# Patient Record
Sex: MALE | Race: WHITE | NOT HISPANIC OR LATINO | URBAN - NONMETROPOLITAN AREA
[De-identification: names, ages, dates, MRNs, and addresses within clinical notes are randomized per-mention and may not be internally consistent; named-entity substitution may affect disease eponyms.]

---

## 2017-05-03 ENCOUNTER — FOLLOW UP ESTABLISHED (OUTPATIENT)
Dept: URBAN - NONMETROPOLITAN AREA CLINIC 13 | Facility: CLINIC | Age: 82
End: 2017-05-03
Payer: COMMERCIAL

## 2017-05-03 DIAGNOSIS — H35.363 DRUSEN (DEGENERATIVE) OF MACULA, BILATERAL: Primary | ICD-10-CM

## 2017-05-03 PROCEDURE — 92014 COMPRE OPH EXAM EST PT 1/>: CPT | Performed by: OPTOMETRIST

## 2017-05-03 PROCEDURE — 92134 CPTRZ OPH DX IMG PST SGM RTA: CPT | Performed by: OPTOMETRIST

## 2017-05-03 PROCEDURE — 92015 DETERMINE REFRACTIVE STATE: CPT | Performed by: OPTOMETRIST

## 2017-05-03 ASSESSMENT — INTRAOCULAR PRESSURE
OD: 11
OS: 12

## 2017-05-03 ASSESSMENT — VISUAL ACUITY
OS: 20/25
OD: 20/25

## 2018-06-13 ENCOUNTER — FOLLOW UP ESTABLISHED (OUTPATIENT)
Dept: URBAN - NONMETROPOLITAN AREA CLINIC 13 | Facility: CLINIC | Age: 83
End: 2018-06-13
Payer: COMMERCIAL

## 2018-06-13 DIAGNOSIS — H53.2 DIPLOPIA: ICD-10-CM

## 2018-06-13 PROCEDURE — 92134 CPTRZ OPH DX IMG PST SGM RTA: CPT | Performed by: OPTOMETRIST

## 2018-06-13 PROCEDURE — 92014 COMPRE OPH EXAM EST PT 1/>: CPT | Performed by: OPTOMETRIST

## 2018-06-13 PROCEDURE — 92015 DETERMINE REFRACTIVE STATE: CPT | Performed by: OPTOMETRIST

## 2018-06-13 ASSESSMENT — INTRAOCULAR PRESSURE
OD: 12
OS: 12

## 2018-06-13 ASSESSMENT — VISUAL ACUITY
OS: 20/25
OD: 20/25

## 2019-05-29 ENCOUNTER — FOLLOW UP ESTABLISHED (OUTPATIENT)
Dept: URBAN - NONMETROPOLITAN AREA CLINIC 13 | Facility: CLINIC | Age: 84
End: 2019-05-29
Payer: COMMERCIAL

## 2019-05-29 DIAGNOSIS — H04.123 DRY EYE SYNDROME OF BILATERAL LACRIMAL GLANDS: ICD-10-CM

## 2019-05-29 PROCEDURE — 92134 CPTRZ OPH DX IMG PST SGM RTA: CPT | Performed by: OPTOMETRIST

## 2019-05-29 PROCEDURE — 92015 DETERMINE REFRACTIVE STATE: CPT | Performed by: OPTOMETRIST

## 2019-05-29 PROCEDURE — 92014 COMPRE OPH EXAM EST PT 1/>: CPT | Performed by: OPTOMETRIST

## 2019-05-29 ASSESSMENT — VISUAL ACUITY
OD: 20/20
OS: 20/20

## 2019-05-29 ASSESSMENT — INTRAOCULAR PRESSURE
OD: 11
OS: 9

## 2020-09-02 ENCOUNTER — FOLLOW UP ESTABLISHED (OUTPATIENT)
Dept: URBAN - NONMETROPOLITAN AREA CLINIC 13 | Facility: CLINIC | Age: 85
End: 2020-09-02
Payer: COMMERCIAL

## 2020-09-02 DIAGNOSIS — H35.372 PUCKERING OF MACULA, LEFT EYE: ICD-10-CM

## 2020-09-02 DIAGNOSIS — H52.223 REGULAR ASTIGMATISM, BILATERAL: ICD-10-CM

## 2020-09-02 PROCEDURE — 92134 CPTRZ OPH DX IMG PST SGM RTA: CPT | Performed by: OPTOMETRIST

## 2020-09-02 PROCEDURE — 92014 COMPRE OPH EXAM EST PT 1/>: CPT | Performed by: OPTOMETRIST

## 2020-09-02 PROCEDURE — 92015 DETERMINE REFRACTIVE STATE: CPT | Performed by: OPTOMETRIST

## 2020-09-02 ASSESSMENT — VISUAL ACUITY
OS: 20/25
OD: 20/30

## 2020-09-02 ASSESSMENT — INTRAOCULAR PRESSURE
OS: 11
OD: 13

## 2020-11-30 ENCOUNTER — FOLLOW UP ESTABLISHED (OUTPATIENT)
Dept: URBAN - NONMETROPOLITAN AREA CLINIC 13 | Facility: CLINIC | Age: 85
End: 2020-11-30
Payer: COMMERCIAL

## 2020-11-30 DIAGNOSIS — H17.11 CENTRAL CORNEAL OPACITY, RIGHT EYE: ICD-10-CM

## 2020-11-30 DIAGNOSIS — H25.13 AGE-RELATED NUCLEAR CATARACT, BILATERAL: ICD-10-CM

## 2020-11-30 PROCEDURE — 99213 OFFICE O/P EST LOW 20 MIN: CPT | Performed by: OPTOMETRIST

## 2020-11-30 ASSESSMENT — VISUAL ACUITY
OS: 20/30
OD: 20/30

## 2020-11-30 ASSESSMENT — INTRAOCULAR PRESSURE
OD: 10
OS: 8

## 2021-03-01 ENCOUNTER — RX CHECK (OUTPATIENT)
Dept: URBAN - NONMETROPOLITAN AREA CLINIC 13 | Facility: CLINIC | Age: 86
End: 2021-03-01

## 2021-03-01 PROCEDURE — 92015 DETERMINE REFRACTIVE STATE: CPT | Performed by: OPTOMETRIST

## 2021-03-01 ASSESSMENT — VISUAL ACUITY
OD: 20/25
OS: 20/20

## 2021-03-01 ASSESSMENT — INTRAOCULAR PRESSURE
OD: 11
OS: 9

## 2022-05-11 ENCOUNTER — OFFICE VISIT (OUTPATIENT)
Dept: URBAN - NONMETROPOLITAN AREA CLINIC 13 | Facility: CLINIC | Age: 87
End: 2022-05-11
Payer: COMMERCIAL

## 2022-05-11 DIAGNOSIS — H53.2 DIPLOPIA: ICD-10-CM

## 2022-05-11 DIAGNOSIS — H52.223 REGULAR ASTIGMATISM, BILATERAL: ICD-10-CM

## 2022-05-11 DIAGNOSIS — H04.123 TEAR FILM INSUFFICIENCY OF BILATERAL LACRIMAL GLANDS: ICD-10-CM

## 2022-05-11 DIAGNOSIS — H25.13 AGE-RELATED NUCLEAR CATARACT, BILATERAL: Primary | ICD-10-CM

## 2022-05-11 DIAGNOSIS — H17.11 CENTRAL CORNEAL OPACITY OF RIGHT EYE: ICD-10-CM

## 2022-05-11 DIAGNOSIS — H35.363 DRUSEN (DEGENERATIVE) OF MACULA, BILATERAL: ICD-10-CM

## 2022-05-11 PROCEDURE — 99214 OFFICE O/P EST MOD 30 MIN: CPT | Performed by: OPTOMETRIST

## 2022-05-11 ASSESSMENT — INTRAOCULAR PRESSURE
OD: 11
OS: 10

## 2022-05-11 ASSESSMENT — VISUAL ACUITY
OS: 20/20
OD: 20/20

## 2022-05-11 NOTE — IMPRESSION/PLAN
Impression: Regular astigmatism, bilateral: H52.223. Plan: postponed rx 2' to large amounts or prism. will refer for strab surgery to try to get eyes in better alignment, then rtc for glasses check.

## 2022-05-11 NOTE — IMPRESSION/PLAN
Impression: Drusen (degenerative) of macula, bilateral: H35.363. Plan: stable mild changes on exam and previous oct, needs monitor.

## 2022-05-11 NOTE — IMPRESSION/PLAN
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 Plan: longstanding and stable, continue w/ tears daily OU.

## 2022-05-11 NOTE — IMPRESSION/PLAN
Impression: Diplopia: H53.2. Plan: pt has esotropia w/ worsening double and increasing prism but prism so strong, having hard time function w/ glasses refer to strab surgeon for consult w/ possible improved ocular alignment.

## 2022-05-11 NOTE — IMPRESSION/PLAN
Impression: Age-related nuclear cataract, bilateral: H25.13. Plan: Cataracts account for the patient's complaints. Discussed options, surgery or spectacle change. Explained surgery risks, benefits, procedures and recovery. Patient defers surgery and elects to try strab surgery first.  If there is no improvement, ok to schedule surgery.

## 2023-05-24 ENCOUNTER — OFFICE VISIT (OUTPATIENT)
Dept: URBAN - NONMETROPOLITAN AREA CLINIC 13 | Facility: CLINIC | Age: 88
End: 2023-05-24
Payer: COMMERCIAL

## 2023-05-24 DIAGNOSIS — H04.123 TEAR FILM INSUFFICIENCY OF BILATERAL LACRIMAL GLANDS: ICD-10-CM

## 2023-05-24 DIAGNOSIS — H53.2 DIPLOPIA: ICD-10-CM

## 2023-05-24 DIAGNOSIS — H25.813 COMBINED FORMS OF AGE-RELATED CATARACT, BILATERAL: Primary | ICD-10-CM

## 2023-05-24 DIAGNOSIS — H35.372 PUCKERING OF MACULA, LEFT EYE: ICD-10-CM

## 2023-05-24 DIAGNOSIS — H52.223 REGULAR ASTIGMATISM, BILATERAL: ICD-10-CM

## 2023-05-24 PROCEDURE — 92134 CPTRZ OPH DX IMG PST SGM RTA: CPT | Performed by: OPTOMETRIST

## 2023-05-24 PROCEDURE — 99214 OFFICE O/P EST MOD 30 MIN: CPT | Performed by: OPTOMETRIST

## 2023-05-24 ASSESSMENT — VISUAL ACUITY
OS: 20/25
OD: 20/20

## 2023-05-24 ASSESSMENT — INTRAOCULAR PRESSURE
OS: 9
OD: 9

## 2023-05-24 NOTE — IMPRESSION/PLAN
Impression: Puckering of macula, left eye: H35.372. Plan: mild on exam and oct but still may limit post operative outcome.

## 2023-05-24 NOTE — IMPRESSION/PLAN
Impression: Combined forms of age-related cataract, bilateral: H25.813. Plan: Cataracts account for the patient's complaints. Discussed all risks, benefits, procedures and recovery. Patient understands changing glasses will not improve vision. Patient desires to have surgery, recommend phacoemulsification with intraocular lens.  plano outcome rec'd OS then OD, start tears daily, pt has diplopia so will be affected post surgery, refer to omd.

## 2023-05-24 NOTE — IMPRESSION/PLAN
Impression: Diplopia: H53.2.  Plan: pt has esotropia w/ worsening double and increasing prism but prism, still just monitor for now, will reassess after cat sx OU

## 2023-07-13 ENCOUNTER — OFFICE VISIT (OUTPATIENT)
Dept: URBAN - NONMETROPOLITAN AREA CLINIC 13 | Facility: CLINIC | Age: 88
End: 2023-07-13
Payer: COMMERCIAL

## 2023-07-13 DIAGNOSIS — H04.123 TEAR FILM INSUFFICIENCY OF BILATERAL LACRIMAL GLANDS: ICD-10-CM

## 2023-07-13 DIAGNOSIS — H35.372 PUCKERING OF MACULA, LEFT EYE: ICD-10-CM

## 2023-07-13 DIAGNOSIS — H25.811 COMBINED FORMS OF AGE-RELATED CATARACT, RIGHT EYE: ICD-10-CM

## 2023-07-13 DIAGNOSIS — H25.813 COMBINED FORMS OF AGE-RELATED CATARACT, BILATERAL: Primary | ICD-10-CM

## 2023-07-13 DIAGNOSIS — H53.2 DIPLOPIA: ICD-10-CM

## 2023-07-13 PROCEDURE — 99204 OFFICE O/P NEW MOD 45 MIN: CPT | Performed by: OPHTHALMOLOGY

## 2023-07-13 ASSESSMENT — INTRAOCULAR PRESSURE
OS: 9
OD: 7

## 2023-07-13 NOTE — IMPRESSION/PLAN
Impression: Combined forms of age-related cataract, bilateral: H25.813. Plan: Cataracts account for patient visual complaints and decrease in ADL's.  expect surgery to improve functional vision. Glasses do not adequately correct vision. Discussed diagnosis in detail with patient. Discussed treatment options with patient. Patient elects to have surgery. Surgical risks and benefits were discussed, explained and understood by patient. OS then OD std iol aim plano OD  plano OS ok dextenza.   order ascan/optical biometry for iol calculation will need glasses with prism for diplopia no PHONG

## 2023-07-24 ENCOUNTER — TESTING ONLY (OUTPATIENT)
Dept: URBAN - NONMETROPOLITAN AREA CLINIC 13 | Facility: CLINIC | Age: 88
End: 2023-07-24
Payer: COMMERCIAL

## 2023-07-24 DIAGNOSIS — Z01.818 ENCOUNTER FOR OTHER PREPROCEDURAL EXAMINATION: Primary | ICD-10-CM

## 2023-07-24 DIAGNOSIS — H25.13 AGE-RELATED NUCLEAR CATARACT, BILATERAL: ICD-10-CM

## 2023-07-24 PROCEDURE — 99203 OFFICE O/P NEW LOW 30 MIN: CPT | Performed by: PHYSICIAN ASSISTANT

## 2023-08-14 ENCOUNTER — SURGERY (OUTPATIENT)
Dept: URBAN - NONMETROPOLITAN AREA SURGERY 4 | Facility: SURGERY | Age: 88
End: 2023-08-14
Payer: COMMERCIAL

## 2023-08-14 DIAGNOSIS — H25.813 COMBINED FORMS OF AGE-RELATED CATARACT, BILATERAL: Primary | ICD-10-CM

## 2023-08-14 PROCEDURE — 66984 XCAPSL CTRC RMVL W/O ECP: CPT | Performed by: OPHTHALMOLOGY

## 2023-08-15 ENCOUNTER — POST-OPERATIVE VISIT (OUTPATIENT)
Dept: URBAN - NONMETROPOLITAN AREA CLINIC 13 | Facility: CLINIC | Age: 88
End: 2023-08-15
Payer: COMMERCIAL

## 2023-08-15 DIAGNOSIS — Z48.810 ENCOUNTER FOR SURGICAL AFTERCARE FOLLOWING SURGERY ON A SENSE ORGAN: Primary | ICD-10-CM

## 2023-08-15 PROCEDURE — 99024 POSTOP FOLLOW-UP VISIT: CPT | Performed by: OPTOMETRIST

## 2023-08-15 ASSESSMENT — INTRAOCULAR PRESSURE
OD: 14
OS: 17

## 2023-08-21 ENCOUNTER — POST-OPERATIVE VISIT (OUTPATIENT)
Dept: URBAN - NONMETROPOLITAN AREA CLINIC 13 | Facility: CLINIC | Age: 88
End: 2023-08-21
Payer: COMMERCIAL

## 2023-08-21 DIAGNOSIS — Z48.810 ENCOUNTER FOR SURGICAL AFTERCARE FOLLOWING SURGERY ON A SENSE ORGAN: ICD-10-CM

## 2023-08-21 DIAGNOSIS — H52.223 REGULAR ASTIGMATISM, BILATERAL: Primary | ICD-10-CM

## 2023-08-21 PROCEDURE — 99024 POSTOP FOLLOW-UP VISIT: CPT | Performed by: OPTOMETRIST

## 2023-08-21 PROCEDURE — 92015 DETERMINE REFRACTIVE STATE: CPT | Performed by: OPTOMETRIST

## 2023-08-21 ASSESSMENT — VISUAL ACUITY
OD: 20/20
OS: 20/20

## 2023-08-21 ASSESSMENT — INTRAOCULAR PRESSURE
OS: 6
OD: 7

## 2023-08-28 ENCOUNTER — SURGERY (OUTPATIENT)
Dept: URBAN - NONMETROPOLITAN AREA SURGERY 4 | Facility: SURGERY | Age: 88
End: 2023-08-28
Payer: COMMERCIAL

## 2023-08-28 DIAGNOSIS — H25.811 COMBINED FORMS OF AGE-RELATED CATARACT, RIGHT EYE: Primary | ICD-10-CM

## 2023-08-28 PROCEDURE — 66982 XCAPSL CTRC RMVL CPLX WO ECP: CPT | Performed by: OPHTHALMOLOGY

## 2023-08-29 ENCOUNTER — POST-OPERATIVE VISIT (OUTPATIENT)
Dept: URBAN - NONMETROPOLITAN AREA CLINIC 13 | Facility: CLINIC | Age: 88
End: 2023-08-29
Payer: COMMERCIAL

## 2023-08-29 DIAGNOSIS — Z96.1 PRESENCE OF INTRAOCULAR LENS: Primary | ICD-10-CM

## 2023-08-29 PROCEDURE — 99024 POSTOP FOLLOW-UP VISIT: CPT | Performed by: OPTOMETRIST

## 2023-08-29 ASSESSMENT — INTRAOCULAR PRESSURE
OD: 13
OS: 14

## 2023-09-05 ENCOUNTER — POST-OPERATIVE VISIT (OUTPATIENT)
Dept: URBAN - NONMETROPOLITAN AREA CLINIC 13 | Facility: CLINIC | Age: 88
End: 2023-09-05
Payer: COMMERCIAL

## 2023-09-05 DIAGNOSIS — H52.223 REGULAR ASTIGMATISM, BILATERAL: Primary | ICD-10-CM

## 2023-09-05 DIAGNOSIS — Z96.1 PRESENCE OF INTRAOCULAR LENS: ICD-10-CM

## 2023-09-05 PROCEDURE — 99024 POSTOP FOLLOW-UP VISIT: CPT | Performed by: OPTOMETRIST

## 2023-09-05 PROCEDURE — 92015 DETERMINE REFRACTIVE STATE: CPT | Performed by: OPTOMETRIST

## 2023-09-05 ASSESSMENT — VISUAL ACUITY
OS: 20/20
OD: 20/25

## 2023-09-05 ASSESSMENT — INTRAOCULAR PRESSURE
OS: 15
OD: 15

## 2023-09-21 ENCOUNTER — POST-OPERATIVE VISIT (OUTPATIENT)
Dept: URBAN - NONMETROPOLITAN AREA CLINIC 13 | Facility: CLINIC | Age: 88
End: 2023-09-21
Payer: COMMERCIAL

## 2023-09-21 DIAGNOSIS — H52.223 REGULAR ASTIGMATISM, BILATERAL: Primary | ICD-10-CM

## 2023-09-21 DIAGNOSIS — Z96.1 PRESENCE OF INTRAOCULAR LENS: ICD-10-CM

## 2023-09-21 DIAGNOSIS — H52.4 PRESBYOPIA: ICD-10-CM

## 2023-09-21 PROCEDURE — 99024 POSTOP FOLLOW-UP VISIT: CPT | Performed by: OPTOMETRIST

## 2023-09-21 PROCEDURE — 92015 DETERMINE REFRACTIVE STATE: CPT | Performed by: OPTOMETRIST

## 2023-09-21 ASSESSMENT — INTRAOCULAR PRESSURE
OD: 14
OS: 15

## 2023-09-21 ASSESSMENT — VISUAL ACUITY
OS: 20/20
OD: 20/20

## 2024-04-04 ENCOUNTER — OFFICE VISIT (OUTPATIENT)
Dept: URBAN - NONMETROPOLITAN AREA CLINIC 13 | Facility: CLINIC | Age: 89
End: 2024-04-04
Payer: COMMERCIAL

## 2024-04-04 DIAGNOSIS — H43.393 OTHER VITREOUS OPACITIES, BILATERAL: ICD-10-CM

## 2024-04-04 DIAGNOSIS — H53.2 DIPLOPIA: ICD-10-CM

## 2024-04-04 DIAGNOSIS — Z96.1 PRESENCE OF INTRAOCULAR LENS: ICD-10-CM

## 2024-04-04 DIAGNOSIS — H35.372 PUCKERING OF MACULA, LEFT EYE: Primary | ICD-10-CM

## 2024-04-04 DIAGNOSIS — H26.493 OTHER SECONDARY CATARACT, BILATERAL: ICD-10-CM

## 2024-04-04 PROCEDURE — 99214 OFFICE O/P EST MOD 30 MIN: CPT | Performed by: OPTOMETRIST

## 2024-04-04 PROCEDURE — 92134 CPTRZ OPH DX IMG PST SGM RTA: CPT | Performed by: OPTOMETRIST

## 2024-04-04 RX ORDER — ALLOPURINOL 100 MG/1
100 MG TABLET ORAL
Qty: 0 | Refills: 0 | Status: ACTIVE
Start: 2024-04-04

## 2024-04-04 RX ORDER — TAMSULOSIN HYDROCHLORIDE 0.4 MG/1
0.4 MG CAPSULE ORAL
Qty: 0 | Refills: 0 | Status: ACTIVE
Start: 2024-04-04

## 2024-04-04 RX ORDER — FLUTICASONE PROPIONATE AND SALMETEROL 50; 250 UG/1; UG/1
POWDER RESPIRATORY (INHALATION)
Qty: 0 | Refills: 0 | Status: ACTIVE
Start: 2024-04-04

## 2024-04-04 RX ORDER — ATORVASTATIN CALCIUM 20 MG/1
20 MG TABLET, FILM COATED ORAL
Qty: 0 | Refills: 0 | Status: ACTIVE
Start: 2024-04-04

## 2024-04-04 RX ORDER — IBRUTINIB 280 MG/1
280 MG TABLET, FILM COATED ORAL
Qty: 0 | Refills: 0 | Status: ACTIVE
Start: 2024-04-04

## 2024-04-04 RX ORDER — AMLODIPINE BESYLATE 5 MG/1
5 MG TABLET ORAL
Qty: 0 | Refills: 0 | Status: ACTIVE
Start: 2024-04-04

## 2024-04-04 RX ORDER — ATENOLOL 50 MG/1
50 MG TABLET ORAL
Qty: 0 | Refills: 0 | Status: ACTIVE
Start: 2024-04-04

## 2024-04-04 RX ORDER — LEVOTHYROXINE SODIUM 50 UG/1
TABLET ORAL
Qty: 0 | Refills: 0 | Status: ACTIVE
Start: 2024-04-04

## 2024-04-04 RX ORDER — FUROSEMIDE 40 MG/1
40 MG TABLET ORAL
Qty: 0 | Refills: 0 | Status: ACTIVE
Start: 2024-04-04

## 2024-04-04 RX ORDER — MULTIVITAMIN
TABLET ORAL
Qty: 0 | Refills: 0 | Status: ACTIVE
Start: 2024-04-04

## 2024-04-04 ASSESSMENT — INTRAOCULAR PRESSURE
OS: 13
OD: 14

## 2025-04-09 ENCOUNTER — OFFICE VISIT (OUTPATIENT)
Dept: URBAN - NONMETROPOLITAN AREA CLINIC 13 | Facility: CLINIC | Age: OVER 89
End: 2025-04-09
Payer: COMMERCIAL

## 2025-04-09 DIAGNOSIS — H26.493 OTHER SECONDARY CATARACT, BILATERAL: ICD-10-CM

## 2025-04-09 DIAGNOSIS — H53.2 DIPLOPIA: ICD-10-CM

## 2025-04-09 DIAGNOSIS — H43.393 OTHER VITREOUS OPACITIES, BILATERAL: ICD-10-CM

## 2025-04-09 DIAGNOSIS — H52.4 PRESBYOPIA: ICD-10-CM

## 2025-04-09 DIAGNOSIS — H35.372 PUCKERING OF MACULA, LEFT EYE: Primary | ICD-10-CM

## 2025-04-09 PROCEDURE — 92134 CPTRZ OPH DX IMG PST SGM RTA: CPT | Performed by: OPTOMETRIST

## 2025-04-09 PROCEDURE — 92014 COMPRE OPH EXAM EST PT 1/>: CPT | Performed by: OPTOMETRIST

## 2025-04-09 ASSESSMENT — INTRAOCULAR PRESSURE
OS: 13
OD: 13

## 2025-04-09 ASSESSMENT — VISUAL ACUITY
OD: 20/20
OS: 20/20